# Patient Record
Sex: FEMALE | Race: WHITE
[De-identification: names, ages, dates, MRNs, and addresses within clinical notes are randomized per-mention and may not be internally consistent; named-entity substitution may affect disease eponyms.]

---

## 2018-09-16 ENCOUNTER — HOSPITAL ENCOUNTER (OUTPATIENT)
Dept: HOSPITAL 61 - ER | Age: 83
Setting detail: OBSERVATION
LOS: 1 days | Discharge: HOME | End: 2018-09-17
Attending: INTERNAL MEDICINE | Admitting: INTERNAL MEDICINE
Payer: MEDICARE

## 2018-09-16 VITALS — SYSTOLIC BLOOD PRESSURE: 102 MMHG | DIASTOLIC BLOOD PRESSURE: 53 MMHG

## 2018-09-16 VITALS — BODY MASS INDEX: 34.17 KG/M2 | WEIGHT: 200.13 LBS | HEIGHT: 64 IN

## 2018-09-16 VITALS — DIASTOLIC BLOOD PRESSURE: 56 MMHG | SYSTOLIC BLOOD PRESSURE: 112 MMHG

## 2018-09-16 VITALS — DIASTOLIC BLOOD PRESSURE: 71 MMHG | SYSTOLIC BLOOD PRESSURE: 138 MMHG

## 2018-09-16 DIAGNOSIS — Z79.82: ICD-10-CM

## 2018-09-16 DIAGNOSIS — Z79.01: ICD-10-CM

## 2018-09-16 DIAGNOSIS — Z90.12: ICD-10-CM

## 2018-09-16 DIAGNOSIS — L03.115: Primary | ICD-10-CM

## 2018-09-16 DIAGNOSIS — R42: ICD-10-CM

## 2018-09-16 DIAGNOSIS — Z85.3: ICD-10-CM

## 2018-09-16 DIAGNOSIS — D70.9: ICD-10-CM

## 2018-09-16 LAB
ALBUMIN SERPL-MCNC: 3.7 G/DL (ref 3.4–5)
ALBUMIN/GLOB SERPL: 1.1 {RATIO} (ref 1–1.7)
ALP SERPL-CCNC: 66 U/L (ref 46–116)
ALT SERPL-CCNC: 21 U/L (ref 14–59)
ANION GAP SERPL CALC-SCNC: 11 MMOL/L (ref 6–14)
APTT BLD: 28 SEC (ref 24–38)
APTT PPP: YELLOW S
AST SERPL-CCNC: 21 U/L (ref 15–37)
BACTERIA #/AREA URNS HPF: (no result) /HPF
BASOPHILS # BLD AUTO: 0 X10^3/UL (ref 0–0.2)
BASOPHILS NFR BLD: 1 % (ref 0–3)
BILIRUB SERPL-MCNC: 0.3 MG/DL (ref 0.2–1)
BILIRUB UR QL STRIP: NEGATIVE
BUN SERPL-MCNC: 22 MG/DL (ref 7–20)
BUN/CREAT SERPL: 28 (ref 6–20)
CALCIUM SERPL-MCNC: 8.8 MG/DL (ref 8.5–10.1)
CHLORIDE SERPL-SCNC: 104 MMOL/L (ref 98–107)
CO2 SERPL-SCNC: 27 MMOL/L (ref 21–32)
CREAT SERPL-MCNC: 0.8 MG/DL (ref 0.6–1)
EOSINOPHIL NFR BLD: 0.2 X10^3/UL (ref 0–0.7)
EOSINOPHIL NFR BLD: 5 % (ref 0–3)
ERYTHROCYTE [DISTWIDTH] IN BLOOD BY AUTOMATED COUNT: 13.6 % (ref 11.5–14.5)
FIBRINOGEN PPP-MCNC: CLEAR MG/DL
GFR SERPLBLD BASED ON 1.73 SQ M-ARVRAT: 68.5 ML/MIN
GLOBULIN SER-MCNC: 3.5 G/DL (ref 2.2–3.8)
GLUCOSE SERPL-MCNC: 98 MG/DL (ref 70–99)
HCT VFR BLD CALC: 38.5 % (ref 36–47)
HGB BLD-MCNC: 13.1 G/DL (ref 12–15.5)
LYMPHOCYTES # BLD: 1 X10^3/UL (ref 1–4.8)
LYMPHOCYTES NFR BLD AUTO: 29 % (ref 24–48)
MAGNESIUM SERPL-MCNC: 2 MG/DL (ref 1.8–2.4)
MCH RBC QN AUTO: 31 PG (ref 25–35)
MCHC RBC AUTO-ENTMCNC: 34 G/DL (ref 31–37)
MCV RBC AUTO: 91 FL (ref 79–100)
MONO #: 0.5 X10^3/UL (ref 0–1.1)
MONOCYTES NFR BLD: 13 % (ref 0–9)
NEUT #: 1.8 X10^3UL (ref 1.8–7.7)
NEUTROPHILS NFR BLD AUTO: 52 % (ref 31–73)
NITRITE UR QL STRIP: NEGATIVE
PH UR STRIP: 6.5 [PH]
PLATELET # BLD AUTO: 161 X10^3/UL (ref 140–400)
POTASSIUM SERPL-SCNC: 4 MMOL/L (ref 3.5–5.1)
PROT SERPL-MCNC: 7.2 G/DL (ref 6.4–8.2)
PROT UR STRIP-MCNC: NEGATIVE MG/DL
PROTHROMBIN TIME: 12.7 SEC (ref 11.7–14)
RBC # BLD AUTO: 4.23 X10^6/UL (ref 3.5–5.4)
RBC #/AREA URNS HPF: (no result) /HPF (ref 0–2)
SODIUM SERPL-SCNC: 142 MMOL/L (ref 136–145)
SQUAMOUS #/AREA URNS LPF: (no result) /LPF
UROBILINOGEN UR-MCNC: 0.2 MG/DL
WBC # BLD AUTO: 3.5 X10^3/UL (ref 4–11)
WBC #/AREA URNS HPF: (no result) /HPF (ref 0–4)

## 2018-09-16 PROCEDURE — 85610 PROTHROMBIN TIME: CPT

## 2018-09-16 PROCEDURE — 80053 COMPREHEN METABOLIC PANEL: CPT

## 2018-09-16 PROCEDURE — 96366 THER/PROPH/DIAG IV INF ADDON: CPT

## 2018-09-16 PROCEDURE — 87086 URINE CULTURE/COLONY COUNT: CPT

## 2018-09-16 PROCEDURE — 83605 ASSAY OF LACTIC ACID: CPT

## 2018-09-16 PROCEDURE — 84484 ASSAY OF TROPONIN QUANT: CPT

## 2018-09-16 PROCEDURE — 93005 ELECTROCARDIOGRAM TRACING: CPT

## 2018-09-16 PROCEDURE — 99285 EMERGENCY DEPT VISIT HI MDM: CPT

## 2018-09-16 PROCEDURE — 87186 SC STD MICRODIL/AGAR DIL: CPT

## 2018-09-16 PROCEDURE — 96367 TX/PROPH/DG ADDL SEQ IV INF: CPT

## 2018-09-16 PROCEDURE — 96375 TX/PRO/DX INJ NEW DRUG ADDON: CPT

## 2018-09-16 PROCEDURE — 85025 COMPLETE CBC W/AUTO DIFF WBC: CPT

## 2018-09-16 PROCEDURE — 85730 THROMBOPLASTIN TIME PARTIAL: CPT

## 2018-09-16 PROCEDURE — 96361 HYDRATE IV INFUSION ADD-ON: CPT

## 2018-09-16 PROCEDURE — 81001 URINALYSIS AUTO W/SCOPE: CPT

## 2018-09-16 PROCEDURE — 87040 BLOOD CULTURE FOR BACTERIA: CPT

## 2018-09-16 PROCEDURE — G0379 DIRECT REFER HOSPITAL OBSERV: HCPCS

## 2018-09-16 PROCEDURE — 36415 COLL VENOUS BLD VENIPUNCTURE: CPT

## 2018-09-16 PROCEDURE — 82962 GLUCOSE BLOOD TEST: CPT

## 2018-09-16 PROCEDURE — 93971 EXTREMITY STUDY: CPT

## 2018-09-16 PROCEDURE — 83735 ASSAY OF MAGNESIUM: CPT

## 2018-09-16 PROCEDURE — 96365 THER/PROPH/DIAG IV INF INIT: CPT

## 2018-09-16 PROCEDURE — G0378 HOSPITAL OBSERVATION PER HR: HCPCS

## 2018-09-16 PROCEDURE — 85651 RBC SED RATE NONAUTOMATED: CPT

## 2018-09-16 RX ADMIN — DOCUSATE SODIUM SCH MG: 100 CAPSULE, LIQUID FILLED ORAL at 21:02

## 2018-09-16 RX ADMIN — OXYCODONE HYDROCHLORIDE AND ACETAMINOPHEN SCH MG: 500 TABLET ORAL at 21:02

## 2018-09-16 NOTE — PHYS DOC
Past Medical History


Past Medical History:  Cancer (lt breast), DVT


Additional Past Medical Histor:  cellulitis


Additional Past Surgical Histo:  left mastectomy





Adult General


HPI


HPI





83-year-old female presents to ER via EMS with complaints of generalized 

weakness 2 days. Patient reports she has been feeling warm and her daughter 

noticed redness in her right lower leg a couple of days ago. Patient reports 

she has history of cellulitis which is typically in her left upper extremity 

following a past mastectomy. Patient denies any recent falls or trauma. Patient 

denies chest pain, shortness of air, or palpitations. Patient reports she has 

had regular appetite denying nausea or vomiting. Patient denies urinary 

symptoms.





Review of Systems


Review of Systems





Constitutional: Reports feeling warm with generalized weakness/fatigue


Eyes: Denies change in visual acuity, redness, or eye pain []


HENT: Denies nasal congestion or sore throat []


Respiratory: Denies cough or shortness of breath []


Cardiovascular: Denies CP/palpitations


GI: Denies abdominal pain, nausea, vomiting, bloody stools or diarrhea []


: Denies dysuria or hematuria []


Musculoskeletal: Denies back pain


Integument: Reports redness rt anterior shin


Neurologic: Denies headache, focal weakness or sensory changes []





All other systems were reviewed and found to be within normal limits, except as 

documented in this note.





Current Medications


Current Medications





Current Medications








 Medications


  (Trade)  Dose


 Ordered  Sig/Jim  Start Time


 Stop Time Status Last Admin


Dose Admin


 


 Sodium Chloride  1,000 ml @ 


 1,000 mls/hr  1X  ONCE  9/16/18 10:00


 9/16/18 10:59  9/16/18 09:31


1,000 MLS/HR











Allergies


Allergies





Allergies








Coded Allergies Type Severity Reaction Last Updated Verified


 


  No Known Drug Allergies    9/16/18 No











Physical Exam


Physical Exam





Constitutional: Well developed, well nourished, no acute distress, non-toxic 

appearance. Appears fatigued on initial exam- clear speech. 


HENT: Normocephalic, atraumatic, bilateral ears normal, mucous membranes pink/

moist, no oral exudates, nose normal. []


Eyes: PERRLA, conjunctiva normal, no discharge. [] 


Neck: Normal range of motion, no tenderness, supple


Cardiovascular:Heart rate regular rhythm, no murmur []


Lungs & Thorax:  Bilateral breath sounds clear to auscultation. Resp. equal/

nonlabored


Abdomen: Bowel sounds normal, soft, no tenderness, no masses, no pulsatile 

masses. [] 


Skin: Warm, dry, no erythema, no rash. [] 


Back: No tenderness, no CVA tenderness. [] 


Extremities: No tenderness, no cyanosis, no clubbing, ROM intact, no edema. 

Erythema/warmth rt anterior shin. Calf size symmetric bilat. with no calf 

tenderness


Neurologic: Alert and oriented X 3, normal motor function, normal sensory 

function, no focal deficits noted. []


Psychologic: Affect normal, judgement normal, mood normal. []





Current Patient Data


Vital Signs





 Vital Signs








  Date Time  Temp Pulse Resp B/P (MAP) Pulse Ox O2 Delivery O2 Flow Rate FiO2


 


9/16/18 08:31 98.6 72 18 146/79 (101) 96 Room Air  





 98.6       








Lab Values





 Laboratory Tests








Test


 9/16/18


08:18 9/16/18


09:06


 


Urine Collection Type Void   


 


Urine Color Yellow   


 


Urine Clarity Clear   


 


Urine pH 6.5   


 


Urine Specific Gravity 1.010   


 


Urine Protein


 Negative mg/dL


(NEG-TRACE) 





 


Urine Glucose (UA)


 Negative mg/dL


(NEG) 





 


Urine Ketones (Stick)


 Negative mg/dL


(NEG) 





 


Urine Blood


 Negative (NEG)


 





 


Urine Nitrite


 Negative (NEG)


 





 


Urine Bilirubin


 Negative (NEG)


 





 


Urine Urobilinogen Dipstick


 0.2 mg/dL (0.2


mg/dL) 





 


Urine Leukocyte Esterase Trace (NEG)   


 


Urine RBC


 Occ /HPF (0-2)


 





 


Urine WBC


 5-10 /HPF


(0-4) 





 


Urine Squamous Epithelial


Cells Few /LPF  


 





 


Urine Bacteria


 Few /HPF


(0-FEW) 





 


White Blood Count


 


 3.5 x10^3/uL


(4.0-11.0)  L


 


Red Blood Count


 


 4.23 x10^6/uL


(3.50-5.40)


 


Hemoglobin


 


 13.1 g/dL


(12.0-15.5)


 


Hematocrit


 


 38.5 %


(36.0-47.0)


 


Mean Corpuscular Volume


 


 91 fL ()





 


Mean Corpuscular Hemoglobin  31 pg (25-35)  


 


Mean Corpuscular Hemoglobin


Concent 


 34 g/dL


(31-37)


 


Red Cell Distribution Width


 


 13.6 %


(11.5-14.5)


 


Platelet Count


 


 161 x10^3/uL


(140-400)


 


Neutrophils (%) (Auto)  52 % (31-73)  


 


Lymphocytes (%) (Auto)  29 % (24-48)  


 


Monocytes (%) (Auto)  13 % (0-9)  H


 


Eosinophils (%) (Auto)  5 % (0-3)  H


 


Basophils (%) (Auto)  1 % (0-3)  


 


Neutrophils # (Auto)


 


 1.8 x10^3uL


(1.8-7.7)


 


Lymphocytes # (Auto)


 


 1.0 x10^3/uL


(1.0-4.8)


 


Monocytes # (Auto)


 


 0.5 x10^3/uL


(0.0-1.1)


 


Eosinophils # (Auto)


 


 0.2 x10^3/uL


(0.0-0.7)


 


Basophils # (Auto)


 


 0.0 x10^3/uL


(0.0-0.2)


 


Prothrombin Time


 


 12.7 SEC


(11.7-14.0)


 


Prothrombin Time INR  1.0 (0.8-1.1)  


 


PTT


 


 28 SEC (24-38)





 


Sodium Level


 


 142 mmol/L


(136-145)


 


Potassium Level


 


 4.0 mmol/L


(3.5-5.1)


 


Chloride Level


 


 104 mmol/L


()


 


Carbon Dioxide Level


 


 27 mmol/L


(21-32)


 


Anion Gap  11 (6-14)  


 


Blood Urea Nitrogen


 


 22 mg/dL


(7-20)  H


 


Creatinine


 


 0.8 mg/dL


(0.6-1.0)


 


Estimated GFR


(Cockcroft-Gault) 


 68.5  





 


BUN/Creatinine Ratio  28 (6-20)  H


 


Glucose Level


 


 98 mg/dL


(70-99)


 


Lactic Acid Level


 


 0.8 mmol/L


(0.4-2.0)


 


Calcium Level


 


 8.8 mg/dL


(8.5-10.1)


 


Magnesium Level


 


 2.0 mg/dL


(1.8-2.4)


 


Total Bilirubin


 


 0.3 mg/dL


(0.2-1.0)


 


Aspartate Amino Transferase


(AST) 


 21 U/L (15-37)





 


Alanine Aminotransferase (ALT)


 


 21 U/L (14-59)





 


Alkaline Phosphatase


 


 66 U/L


()


 


Troponin I Quantitative


 


 < 0.017 ng/mL


(0.000-0.055)


 


Total Protein


 


 7.2 g/dL


(6.4-8.2)


 


Albumin


 


 3.7 g/dL


(3.4-5.0)


 


Albumin/Globulin Ratio  1.1 (1.0-1.7)  





 Laboratory Tests


9/16/18 09:06








 Laboratory Tests


9/16/18 09:06











EKG


EKG


[]





Radiology/Procedures


Radiology/Procedures


PROCEDURE: VENOUS LOWER EXTREMITY RIGHT





 


Right lower extremity venous ultrasound, 9/16/2018 :


 


History: Right lower leg erythema


 


Duplex evaluation including grayscale, color flow and spectral Doppler 


analysis was performed.  The femoral and popliteal veins show no filling 


defects to suggest DVT.   The visualized deep veins in the right calf are 


unremarkable.


 


 


IMPRESSION:   There is no sonographic evidence of deep vein thrombosis in 


the right lower extremity


 


Electronically signed by: Rick Moritz, MD (9/16/2018 9:03 AM) [facility]














DICTATED and SIGNED BY:     MORITZ,RICK S MD


DATE:     09/16/18 0902





Course & Med Decision Making


Course & Med Decision Making


Pertinent Labs and Imaging studies reviewed. (See chart for details)





[]





Dragon Disclaimer


Dragon Disclaimer


This electronic medical record was generated, in whole or in part, using a 

voice recognition dictation system.





Departure


Departure


Impression:  


 Primary Impression:  


 Cellulitis of right leg


 Additional Impression:  


 Generalized weakness


Disposition:  09 ADMITTED AS INPATIENT


Admitting Physician:  Monique Steel


Condition:  STABLE





Problem Qualifiers











SANAZ CINTRON Sep 16, 2018 08:24

## 2018-09-16 NOTE — RAD
Right lower extremity venous ultrasound, 9/16/2018 :

 

History: Right lower leg erythema

 

Duplex evaluation including grayscale, color flow and spectral Doppler 

analysis was performed.  The femoral and popliteal veins show no filling 

defects to suggest DVT.   The visualized deep veins in the right calf are 

unremarkable.

 

 

IMPRESSION:   There is no sonographic evidence of deep vein thrombosis in 

the right lower extremity

 

Electronically signed by: Rick Moritz, MD (9/16/2018 9:03 AM) [facility]

## 2018-09-16 NOTE — EKG
General acute hospital

              [address] [place] [address]

Test Date:    2018               Test Time:    08:34:55

Pat Name:     BARBARA HAMEED              Department:   

Patient ID:   [id number]           Room:          

Gender:       F                        Technician:   

:          [id number]               Requested By: SANAZ CINTRON

Order Number: [id number]           Reading MD:   Ramon Tabor

                                 Measurements

Intervals                              Axis          

Rate:         69                       P:            47

MN:           162                      QRS:          -13

QRSD:         72                       T:            -2

QT:           434                                    

QTc:          466                                    

                           Interpretive Statements

SINUS RHYTHM

ATRIAL PREMATURE COMPLEX(ES)

LEFTWARD AXIS

LOW LIMB LEAD VOLTAGE

T ABNORMALITY IN INFERIOR LEADS

NON SPECIFIC ST DEPRESSION

ABNORMAL ECG



Electronically Signed On 2018 11:24:50 CDT by Ramon Tabor

## 2018-09-16 NOTE — PDOC1
History and Physical


Date of Admission


Date of Admission


DATE: 9/16/18 


TIME: 12:50





Identification/Chief Complaint


Chief Complaint


Leg scratches, concerned about beginning cellulitis





Source


Source:  Caregiver, Chart review, Patient





History of Present Illness


History of Present Illness


83-year-old  female who has good IADLs and ADLs, lives at home with 

assistive device when necessary, brought in by concern daughters are at least 

by coaxing of the patient herself she is concerned about a  beginning 

cellulitis in her bilateral lower extremity. She has minor scratches in her 

bilateral anterior shins, but she has a dog that may be scratching or licking 

it. No fevers at home. White count is 3.5, history of breast cancer in the 

1980s with history of left upper extremity cellulitis recurrent hence she is 

over protective with beginning cellulitis. She did complain of some 

lightheadedness and dizziness and feeling unwell in the morning and she was 

concerned about cellulitis hence she was admitted. She claims she gets those 

sxs when she had her recurrent LUE cellulitis back in the days. ESR is only 16. 

No fevers. Did get vancomycin at the emergency room. I have no reason to 

suspect MRSA, wounds are rather very superficial and can be treated with 

outpatient antibiotics. But patient was admitted already. We will check 

orthostatics, Rocephin,PO antibiotic tomorrow on discharge


Have PT OT


Only takes one herbal medicine at home otherwise no other meds. No known drug 

allergies, no smoker no alcohol drinking. Only history of mastectomy, has been 

cancer free since the 1980s.





Fam hx non contributory





Past Medical History


Cardiovascular:  No pertinent hx


Pulmonary:  No pertinent hx


GI:  No pertinent hx


Heme/Onc:  Cancer


Hepatobiliary:  No pertinent hx


Psych:  No pertinent hx


Rheumatologic:  No pertinent hx


Infectious disease:  No pertinent hx


ENT:  No pertinent hx


Renal/:  No pertinent hx


Endocrine:  No pertinent hx


Dermatology:  No pertinent hx





Past Surgical History


Past Surgical History:  Mastectomy





Family History


Family History:  No Significant





Social History


Smoke:  No


ALCOHOL:  none


Drugs:  None





Current Problem List


Problem List


Problems


Medical Problems:


(1) Cellulitis of right leg


Status: Acute  





(2) Generalized weakness


Status: Acute  











Current Medications


Current Medications





Current Medications


Sodium Chloride 1,000 ml @  1,000 mls/hr 1X  ONCE IV  Last administered on 9/16/ 18at 09:31;  Start 9/16/18 at 10:00;  Stop 9/16/18 at 10:59;  Status DC


Vancomycin HCl 250 ml @  250 mls/hr 1X  ONCE IV ;  Start 9/16/18 at 10:15;  

Stop 9/16/18 at 11:14;  Status UNV


Cefazolin Sodium 50 ml @  100 mls/hr 1X  ONCE IV ;  Start 9/16/18 at 10:15;  

Stop 9/16/18 at 10:44;  Status Cancel


Vancomycin HCl 1.75 gm/Sodium Chloride 500 ml @  250 mls/hr 1X  ONCE IV ;  

Start 9/16/18 at 10:30;  Stop 9/16/18 at 12:29;  Status DC


Ondansetron HCl (Zofran) 4 mg PRN Q6HRS  PRN IV NAUSEA/VOMITING 1ST CHOICE;  

Start 9/16/18 at 10:30


Prochlorperazine Edisylate (Compazine) 10 mg PRN Q6HRS  PRN IV NAUSEA/VOMITING 

2ND CHOICE;  Start 9/16/18 at 10:30


Prochlorperazine (Compazine) 25 mg PRN Q12HR  PRN AR NAUSEA/VOMITING;  Start 9/ 16/18 at 10:30


Al Hydroxide/Mg Hydroxide (Mylanta Plus Xs) 30 ml PRN Q3HRS  PRN PO HEARTBURN / 

GAS;  Start 9/16/18 at 10:30


Calcium Carbonate/ Glycine (Tums) 500 mg PRN Q3HRS  PRN PO UPSET STOMACH;  

Start 9/16/18 at 10:30


Oxycodone HCl (Roxicodone) 5 mg PRN Q3HRS  PRN PO SEVERE PAIN;  Start 9/16/18 

at 10:30


Morphine Sulfate (Morphine Sulfate) 1 mg PRN Q1HR  PRN IV PAIN, SEE COMMENTS;  

Start 9/16/18 at 10:30


Acetaminophen (Tylenol) 650 mg PRN Q6HRS  PRN PO Headaches, Temp > 101.5F;  

Start 9/16/18 at 10:30


Ibuprofen (Motrin) 400 mg PRN Q6HRS  PRN PO MILD PAIN;  Start 9/16/18 at 10:30


Docusate Sodium (Colace) 100 mg BID PO ;  Start 9/16/18 at 21:00


Magnesium Hydroxide (Milk Of Magnesia) 2,400 mg PRN Q12HR  PRN PO CONSTIPATION;

  Start 9/16/18 at 10:30


Enoxaparin Sodium (Lovenox 40mg Syringe) 40 mg Q24H SQ ;  Start 9/16/18 at 12:00


Ceftriaxone Sodium 1 gm/ Dextrose 50 ml @  100 mls/hr Q24H IV ;  Start 9/16/18 

at 10:30;  Stop 9/16/18 at 11:45;  Status DC


Ceftriaxone Sodium 50 ml @  100 mls/hr 1X  ONCE IV  Last administered on 9/16/ 18at 11:14;  Start 9/16/18 at 10:45;  Stop 9/16/18 at 11:14;  Status DC


Ceftriaxone Sodium 1 gm/ Dextrose 50 ml @  100 mls/hr Q24H IV ;  Start 9/16/18 

at 12:00;  Status UNV


Ceftriaxone Sodium (Rocephin) 1 gm Q24H IVP ;  Start 9/17/18 at 10:00





Allergies


Allergies:  


Coded Allergies:  


     No Known Drug Allergies (Unverified , 9/16/18)





ROS


Review of System


As per history of present illness, the rest of ROS 14 point negative





Physical Exam


General:  Alert, Oriented X3, Cooperative, No acute distress


HEENT:  Atraumatic, PERRLA, EOMI


Lungs:  Clear to auscultation, Normal air movement


Heart:  S1S2, RRR, no thrills, no rubs, no gallops, no murmurs


Cardiovascular:  S1, S2


Breasts:  Normal, Rt breast nml w/o mass, Lt breast nml w/o mass, Nipples normal


Abdomen:  Normal bowel sounds, Soft, No tenderness, No hepatosplenomegaly, No 

masses


Rectal Exam:  not examined


PELVIC:  Nml ext genitalia


Extremities:  No clubbing, No cyanosis, No edema, Normal pulses, No tenderness/

swelling


Skin:  Other (bilateral superficial scratches or skin tear on bilateral upper 

or lower shin, no weeping lesions, non-foul-smelling, no edema)


Neuro:  Normal gait, Normal speech, Strength at 5/5 X4 ext, Normal tone, 

Sensation intact, Cranial nerves 3-12 NL, Reflexes 2+


Psych/Mental Status:  Mental status NL, Mood NL





Vitals


Vitals





Vital Signs








  Date Time  Temp Pulse Resp B/P (MAP) Pulse Ox O2 Delivery O2 Flow Rate FiO2


 


9/16/18 11:28  68 18  97   


 


9/16/18 08:31 98.6   146/79 (101)  Room Air  





 98.6       











Labs


Labs





Laboratory Tests








Test


 9/16/18


08:18 9/16/18


09:00 9/16/18


09:06


 


Urine Collection Type Void   


 


Urine Color Yellow   


 


Urine Clarity Clear   


 


Urine pH 6.5   


 


Urine Specific Gravity 1.010   


 


Urine Protein


 Negative mg/dL


(NEG-TRACE) 


 





 


Urine Glucose (UA)


 Negative mg/dL


(NEG) 


 





 


Urine Ketones (Stick)


 Negative mg/dL


(NEG) 


 





 


Urine Blood Negative (NEG)   


 


Urine Nitrite Negative (NEG)   


 


Urine Bilirubin Negative (NEG)   


 


Urine Urobilinogen Dipstick


 0.2 mg/dL (0.2


mg/dL) 


 





 


Urine Leukocyte Esterase Trace (NEG)   


 


Urine RBC Occ /HPF (0-2)   


 


Urine WBC


 5-10 /HPF


(0-4) 


 





 


Urine Squamous Epithelial


Cells Few /LPF 


 


 





 


Urine Bacteria


 Few /HPF


(0-FEW) 


 





 


Erythrocyte Sedimentation Rate  16 (0-25)  


 


White Blood Count


 


 


 3.5 x10^3/uL


(4.0-11.0)


 


Red Blood Count


 


 


 4.23 x10^6/uL


(3.50-5.40)


 


Hemoglobin


 


 


 13.1 g/dL


(12.0-15.5)


 


Hematocrit


 


 


 38.5 %


(36.0-47.0)


 


Mean Corpuscular Volume   91 fL () 


 


Mean Corpuscular Hemoglobin   31 pg (25-35) 


 


Mean Corpuscular Hemoglobin


Concent 


 


 34 g/dL


(31-37)


 


Red Cell Distribution Width


 


 


 13.6 %


(11.5-14.5)


 


Platelet Count


 


 


 161 x10^3/uL


(140-400)


 


Neutrophils (%) (Auto)   52 % (31-73) 


 


Lymphocytes (%) (Auto)   29 % (24-48) 


 


Monocytes (%) (Auto)   13 % (0-9) 


 


Eosinophils (%) (Auto)   5 % (0-3) 


 


Basophils (%) (Auto)   1 % (0-3) 


 


Neutrophils # (Auto)


 


 


 1.8 x10^3uL


(1.8-7.7)


 


Lymphocytes # (Auto)


 


 


 1.0 x10^3/uL


(1.0-4.8)


 


Monocytes # (Auto)


 


 


 0.5 x10^3/uL


(0.0-1.1)


 


Eosinophils # (Auto)


 


 


 0.2 x10^3/uL


(0.0-0.7)


 


Basophils # (Auto)


 


 


 0.0 x10^3/uL


(0.0-0.2)


 


Prothrombin Time


 


 


 12.7 SEC


(11.7-14.0)


 


Prothromb Time International


Ratio 


 


 1.0 (0.8-1.1) 





 


Activated Partial


Thromboplast Time 


 


 28 SEC (24-38) 





 


Sodium Level


 


 


 142 mmol/L


(136-145)


 


Potassium Level


 


 


 4.0 mmol/L


(3.5-5.1)


 


Chloride Level


 


 


 104 mmol/L


()


 


Carbon Dioxide Level


 


 


 27 mmol/L


(21-32)


 


Anion Gap   11 (6-14) 


 


Blood Urea Nitrogen


 


 


 22 mg/dL


(7-20)


 


Creatinine


 


 


 0.8 mg/dL


(0.6-1.0)


 


Estimated GFR


(Cockcroft-Gault) 


 


 68.5 





 


BUN/Creatinine Ratio   28 (6-20) 


 


Glucose Level


 


 


 98 mg/dL


(70-99)


 


Lactic Acid Level


 


 


 0.8 mmol/L


(0.4-2.0)


 


Calcium Level


 


 


 8.8 mg/dL


(8.5-10.1)


 


Magnesium Level


 


 


 2.0 mg/dL


(1.8-2.4)


 


Total Bilirubin


 


 


 0.3 mg/dL


(0.2-1.0)


 


Aspartate Amino Transf


(AST/SGOT) 


 


 21 U/L (15-37) 





 


Alanine Aminotransferase


(ALT/SGPT) 


 


 21 U/L (14-59) 





 


Alkaline Phosphatase


 


 


 66 U/L


()


 


Troponin I Quantitative


 


 


 < 0.017 ng/mL


(0.000-0.055)


 


Total Protein


 


 


 7.2 g/dL


(6.4-8.2)


 


Albumin


 


 


 3.7 g/dL


(3.4-5.0)


 


Albumin/Globulin Ratio   1.1 (1.0-1.7) 








Laboratory Tests








Test


 9/16/18


08:18 9/16/18


09:00 9/16/18


09:06


 


Urine Collection Type Void   


 


Urine Color Yellow   


 


Urine Clarity Clear   


 


Urine pH 6.5   


 


Urine Specific Gravity 1.010   


 


Urine Protein


 Negative mg/dL


(NEG-TRACE) 


 





 


Urine Glucose (UA)


 Negative mg/dL


(NEG) 


 





 


Urine Ketones (Stick)


 Negative mg/dL


(NEG) 


 





 


Urine Blood Negative (NEG)   


 


Urine Nitrite Negative (NEG)   


 


Urine Bilirubin Negative (NEG)   


 


Urine Urobilinogen Dipstick


 0.2 mg/dL (0.2


mg/dL) 


 





 


Urine Leukocyte Esterase Trace (NEG)   


 


Urine RBC Occ /HPF (0-2)   


 


Urine WBC


 5-10 /HPF


(0-4) 


 





 


Urine Squamous Epithelial


Cells Few /LPF 


 


 





 


Urine Bacteria


 Few /HPF


(0-FEW) 


 





 


Erythrocyte Sedimentation Rate  16 (0-25)  


 


White Blood Count


 


 


 3.5 x10^3/uL


(4.0-11.0)


 


Red Blood Count


 


 


 4.23 x10^6/uL


(3.50-5.40)


 


Hemoglobin


 


 


 13.1 g/dL


(12.0-15.5)


 


Hematocrit


 


 


 38.5 %


(36.0-47.0)


 


Mean Corpuscular Volume   91 fL () 


 


Mean Corpuscular Hemoglobin   31 pg (25-35) 


 


Mean Corpuscular Hemoglobin


Concent 


 


 34 g/dL


(31-37)


 


Red Cell Distribution Width


 


 


 13.6 %


(11.5-14.5)


 


Platelet Count


 


 


 161 x10^3/uL


(140-400)


 


Neutrophils (%) (Auto)   52 % (31-73) 


 


Lymphocytes (%) (Auto)   29 % (24-48) 


 


Monocytes (%) (Auto)   13 % (0-9) 


 


Eosinophils (%) (Auto)   5 % (0-3) 


 


Basophils (%) (Auto)   1 % (0-3) 


 


Neutrophils # (Auto)


 


 


 1.8 x10^3uL


(1.8-7.7)


 


Lymphocytes # (Auto)


 


 


 1.0 x10^3/uL


(1.0-4.8)


 


Monocytes # (Auto)


 


 


 0.5 x10^3/uL


(0.0-1.1)


 


Eosinophils # (Auto)


 


 


 0.2 x10^3/uL


(0.0-0.7)


 


Basophils # (Auto)


 


 


 0.0 x10^3/uL


(0.0-0.2)


 


Prothrombin Time


 


 


 12.7 SEC


(11.7-14.0)


 


Prothromb Time International


Ratio 


 


 1.0 (0.8-1.1) 





 


Activated Partial


Thromboplast Time 


 


 28 SEC (24-38) 





 


Sodium Level


 


 


 142 mmol/L


(136-145)


 


Potassium Level


 


 


 4.0 mmol/L


(3.5-5.1)


 


Chloride Level


 


 


 104 mmol/L


()


 


Carbon Dioxide Level


 


 


 27 mmol/L


(21-32)


 


Anion Gap   11 (6-14) 


 


Blood Urea Nitrogen


 


 


 22 mg/dL


(7-20)


 


Creatinine


 


 


 0.8 mg/dL


(0.6-1.0)


 


Estimated GFR


(Cockcroft-Gault) 


 


 68.5 





 


BUN/Creatinine Ratio   28 (6-20) 


 


Glucose Level


 


 


 98 mg/dL


(70-99)


 


Lactic Acid Level


 


 


 0.8 mmol/L


(0.4-2.0)


 


Calcium Level


 


 


 8.8 mg/dL


(8.5-10.1)


 


Magnesium Level


 


 


 2.0 mg/dL


(1.8-2.4)


 


Total Bilirubin


 


 


 0.3 mg/dL


(0.2-1.0)


 


Aspartate Amino Transf


(AST/SGOT) 


 


 21 U/L (15-37) 





 


Alanine Aminotransferase


(ALT/SGPT) 


 


 21 U/L (14-59) 





 


Alkaline Phosphatase


 


 


 66 U/L


()


 


Troponin I Quantitative


 


 


 < 0.017 ng/mL


(0.000-0.055)


 


Total Protein


 


 


 7.2 g/dL


(6.4-8.2)


 


Albumin


 


 


 3.7 g/dL


(3.4-5.0)


 


Albumin/Globulin Ratio   1.1 (1.0-1.7) 











VTE Prophylaxis Ordered


VTE Prophylaxis Devices:  Yes


VTE Pharmacological Prophylaxi:  Yes





Assessment/Plan


Assessment/Plan


Bilateral anterior leg wounds, superficial


No sepsis, no SIRS


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


Dizzy, lightheaded


Hx michelle GAMA 2004...





Plan: Rocephin


By mouth antibiotic tomorrow


Check orthostatics she claims she was lightheaded and dizzy


Observation











LEDA WHEELER MD Sep 16, 2018 12:55

## 2018-09-17 VITALS — DIASTOLIC BLOOD PRESSURE: 70 MMHG | SYSTOLIC BLOOD PRESSURE: 116 MMHG

## 2018-09-17 VITALS — SYSTOLIC BLOOD PRESSURE: 135 MMHG | DIASTOLIC BLOOD PRESSURE: 70 MMHG

## 2018-09-17 VITALS — SYSTOLIC BLOOD PRESSURE: 117 MMHG | DIASTOLIC BLOOD PRESSURE: 62 MMHG

## 2018-09-17 VITALS — SYSTOLIC BLOOD PRESSURE: 123 MMHG | DIASTOLIC BLOOD PRESSURE: 75 MMHG

## 2018-09-17 VITALS — DIASTOLIC BLOOD PRESSURE: 57 MMHG | SYSTOLIC BLOOD PRESSURE: 112 MMHG

## 2018-09-17 RX ADMIN — OXYCODONE HYDROCHLORIDE AND ACETAMINOPHEN SCH MG: 500 TABLET ORAL at 09:13

## 2018-09-17 RX ADMIN — DOCUSATE SODIUM SCH MG: 100 CAPSULE, LIQUID FILLED ORAL at 09:13

## 2018-09-17 NOTE — PDOC3
Discharge Summary


Visit Information


Date of Admission:  Sep 16, 2018


Date of Discharge:  Sep 17, 2018


Admitting Diagnosis Comment:


Bilateral anterior leg wounds, superficial


No sepsis, no SIRS


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


Dizzy, lightheaded


Hx michelle [facility] 2004...


Final Diagnosis


Problems


Medical Problems:


(1) Cellulitis of right leg


Status: Acute  





(2) Generalized weakness


Status: Acute  











Brief Hospital Course


Allergies





 Allergies








Coded Allergies Type Severity Reaction Last Updated Verified


 


  No Known Drug Allergies    9/16/18 No








Vital Signs





Vital Signs








  Date Time  Temp Pulse Resp B/P (MAP) Pulse Ox O2 Delivery O2 Flow Rate FiO2


 


9/17/18 08:00      Room Air  


 


9/17/18 07:00 97.8 77 20 123/75 (91) 93   





 97.8       








Lab Results





Laboratory Tests








Test


 9/16/18


08:18 9/16/18


09:00 9/16/18


09:06 9/17/18


06:35


 


Urine Collection Type Void    


 


Urine Color Yellow    


 


Urine Clarity Clear    


 


Urine pH 6.5    


 


Urine Specific Gravity 1.010    


 


Urine Protein


 Negative mg/dL


(NEG-TRACE) 


 


 





 


Urine Glucose (UA)


 Negative mg/dL


(NEG) 


 


 





 


Urine Ketones (Stick)


 Negative mg/dL


(NEG) 


 


 





 


Urine Blood Negative (NEG)    


 


Urine Nitrite Negative (NEG)    


 


Urine Bilirubin Negative (NEG)    


 


Urine Urobilinogen Dipstick


 0.2 mg/dL (0.2


mg/dL) 


 


 





 


Urine Leukocyte Esterase Trace (NEG)    


 


Urine RBC Occ /HPF (0-2)    


 


Urine WBC


 5-10 /HPF


(0-4) 


 


 





 


Urine Squamous Epithelial


Cells Few /LPF 


 


 


 





 


Urine Bacteria


 Few /HPF


(0-FEW) 


 


 





 


Erythrocyte Sedimentation Rate  16 (0-25)   


 


White Blood Count


 


 


 3.5 x10^3/uL


(4.0-11.0) 





 


Red Blood Count


 


 


 4.23 x10^6/uL


(3.50-5.40) 





 


Hemoglobin


 


 


 13.1 g/dL


(12.0-15.5) 





 


Hematocrit


 


 


 38.5 %


(36.0-47.0) 





 


Mean Corpuscular Volume   91 fL ()  


 


Mean Corpuscular Hemoglobin   31 pg (25-35)  


 


Mean Corpuscular Hemoglobin


Concent 


 


 34 g/dL


(31-37) 





 


Red Cell Distribution Width


 


 


 13.6 %


(11.5-14.5) 





 


Platelet Count


 


 


 161 x10^3/uL


(140-400) 





 


Neutrophils (%) (Auto)   52 % (31-73)  


 


Lymphocytes (%) (Auto)   29 % (24-48)  


 


Monocytes (%) (Auto)   13 % (0-9)  


 


Eosinophils (%) (Auto)   5 % (0-3)  


 


Basophils (%) (Auto)   1 % (0-3)  


 


Neutrophils # (Auto)


 


 


 1.8 x10^3uL


(1.8-7.7) 





 


Lymphocytes # (Auto)


 


 


 1.0 x10^3/uL


(1.0-4.8) 





 


Monocytes # (Auto)


 


 


 0.5 x10^3/uL


(0.0-1.1) 





 


Eosinophils # (Auto)


 


 


 0.2 x10^3/uL


(0.0-0.7) 





 


Basophils # (Auto)


 


 


 0.0 x10^3/uL


(0.0-0.2) 





 


Prothrombin Time


 


 


 12.7 SEC


(11.7-14.0) 





 


Prothromb Time International


Ratio 


 


 1.0 (0.8-1.1) 


 





 


Activated Partial


Thromboplast Time 


 


 28 SEC (24-38) 


 





 


Sodium Level


 


 


 142 mmol/L


(136-145) 





 


Potassium Level


 


 


 4.0 mmol/L


(3.5-5.1) 





 


Chloride Level


 


 


 104 mmol/L


() 





 


Carbon Dioxide Level


 


 


 27 mmol/L


(21-32) 





 


Anion Gap   11 (6-14)  


 


Blood Urea Nitrogen


 


 


 22 mg/dL


(7-20) 





 


Creatinine


 


 


 0.8 mg/dL


(0.6-1.0) 





 


Estimated GFR


(Cockcroft-Gault) 


 


 68.5 


 





 


BUN/Creatinine Ratio   28 (6-20)  


 


Glucose Level


 


 


 98 mg/dL


(70-99) 





 


Lactic Acid Level


 


 


 0.8 mmol/L


(0.4-2.0) 





 


Calcium Level


 


 


 8.8 mg/dL


(8.5-10.1) 





 


Magnesium Level


 


 


 2.0 mg/dL


(1.8-2.4) 





 


Total Bilirubin


 


 


 0.3 mg/dL


(0.2-1.0) 





 


Aspartate Amino Transf


(AST/SGOT) 


 


 21 U/L (15-37) 


 





 


Alanine Aminotransferase


(ALT/SGPT) 


 


 21 U/L (14-59) 


 





 


Alkaline Phosphatase


 


 


 66 U/L


() 





 


Troponin I Quantitative


 


 


 < 0.017 ng/mL


(0.000-0.055) 





 


Total Protein


 


 


 7.2 g/dL


(6.4-8.2) 





 


Albumin


 


 


 3.7 g/dL


(3.4-5.0) 





 


Albumin/Globulin Ratio   1.1 (1.0-1.7)  


 


Glucose (Fingerstick)


 


 


 


 112 mg/dL


(70-99)








Laboratory Tests








Test


 9/17/18


06:35


 


Glucose (Fingerstick)


 112 mg/dL


(70-99)








Brief Hospital Course


Ms. Henriquez  is a 83 old  female who was admitted because of rather 

Superficial bilateral anterior shin scratch/wounds initially admitted for so-

called cellulitis but this is not deep into the subcutaneous tissue. She was 

admitted because she had some symptoms of lightheadedness and dizziness upon 

waking up and she claims she would get severe cellulitis with the symptoms per 

she does have history of left upper extremity cellulitis back in 2010 or 2014 

where she had a complicated course hence she was a bit overprotective this 

time. Orthostasis neg


But otherwise sedimentation rate is normal, white count is 3, nontoxic-appearing

, clinically wound is very superficial. Did get a dose of Rocephin but I am 

able to discharge to by mouth Augmentin twice a day for 7 more days


No PT needs. Advised to cover the wound as she has a dog which likes to visit 

them


Consults performed none


proc  performed none


Time spent discharging less than 30 minutes





Discharge Information


Condition at Discharge:  Improved, Stable


Disposition/Orders:  D/C to Home


Scheduled


Amoxicillin/Potassium Clav (Augmentin 875-125 Tablet) 1 Each Tablet, 1 TAB PO 

BID, #14


   Prescribed by: LEDA WHEELER on 9/17/18 0801


Ascorbic Acid (Vitamin C) 500 Mg Capsule.er, 500 MG PO BID, (Reported)


   Entered as Reported by: SOCORRO NETTLES on 9/16/18 1253


   Last Action: Converted on 9/16/18 1331 by LEDA WHEELER


Aspirin (Aspirin) 81 Mg Tab.chew, 1 TAB PO DAILY, #30 Ref 3 (Reported)


   Entered as Reported by: SOCORRO NETTLES on 9/16/18 1253


   Last Action: Continued on 9/16/18 1331 by LEDA WHEELER


Prasterone (Dhea)/Calcium Carb (Dhea 10 Mg Tablet) 1 Each Tablet, 0.5 EACH PO 

DAILY, (Reported)


   Entered as Reported by: SOCORRO NETTLES on 9/16/18 1255


   Last Action: Converted on 9/16/18 1331 by LEDA LAMAR MD Sep 17, 2018 09:44